# Patient Record
Sex: MALE | Race: WHITE | URBAN - METROPOLITAN AREA
[De-identification: names, ages, dates, MRNs, and addresses within clinical notes are randomized per-mention and may not be internally consistent; named-entity substitution may affect disease eponyms.]

---

## 2020-12-11 ENCOUNTER — OFFICE VISIT (OUTPATIENT)
Dept: URBAN - METROPOLITAN AREA CLINIC 13 | Facility: CLINIC | Age: 60
End: 2020-12-11
Payer: COMMERCIAL

## 2020-12-11 DIAGNOSIS — H43.811 VITREOUS DEGENERATION, RIGHT EYE: ICD-10-CM

## 2020-12-11 PROCEDURE — 92134 CPTRZ OPH DX IMG PST SGM RTA: CPT | Performed by: OPHTHALMOLOGY

## 2020-12-11 PROCEDURE — 92014 COMPRE OPH EXAM EST PT 1/>: CPT | Performed by: OPHTHALMOLOGY

## 2020-12-11 PROCEDURE — 67028 INJECTION EYE DRUG: CPT | Performed by: OPHTHALMOLOGY

## 2020-12-11 ASSESSMENT — INTRAOCULAR PRESSURE
OD: 13
OS: 15

## 2020-12-11 NOTE — IMPRESSION/PLAN
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
s/p Ozurdex OU 6/19/2020 Plan: Lost to follow-up for 6 months. Exam/OCT OS show moderate NPDR with persistent CSDME, s/p Ozurdex OU 6/19/2020. No view OD secondary to cataract. H/O poor response with Avastin, good response to Ozurdex. Rec Ozurdex OS today. RBA discussed. Pt elects to proceed with Ozurdex OS. Given h/o poor compliance with visits, pt may be a good candidate for Iluvien in the future. Cont BS/BP/chol control. 

6-8 weeks OCT OU re-eval

## 2021-06-21 ENCOUNTER — OFFICE VISIT (OUTPATIENT)
Dept: URBAN - METROPOLITAN AREA CLINIC 51 | Facility: CLINIC | Age: 61
End: 2021-06-21
Payer: COMMERCIAL

## 2021-06-21 DIAGNOSIS — H25.21 AGE-RELATED CATARACT, MORGANIAN TYPE, RIGHT EYE: ICD-10-CM

## 2021-06-21 DIAGNOSIS — H52.4 PRESBYOPIA: ICD-10-CM

## 2021-06-21 DIAGNOSIS — H25.812 COMBINED FORMS OF AGE-RELATED CATARACT, LEFT EYE: ICD-10-CM

## 2021-06-21 PROCEDURE — 99204 OFFICE O/P NEW MOD 45 MIN: CPT | Performed by: OPTOMETRIST

## 2021-06-21 PROCEDURE — 92134 CPTRZ OPH DX IMG PST SGM RTA: CPT | Performed by: OPTOMETRIST

## 2021-06-21 RX ORDER — METFORMIN HYDROCHLORIDE 500 MG/1
500 MG TABLET, FILM COATED ORAL
Qty: 0 | Refills: 0 | Status: ACTIVE
Start: 2021-06-21

## 2021-06-21 ASSESSMENT — KERATOMETRY: OS: 40.55

## 2021-06-21 ASSESSMENT — INTRAOCULAR PRESSURE
OD: 11
OS: 12

## 2021-06-21 ASSESSMENT — VISUAL ACUITY: OS: 20/100

## 2021-06-21 NOTE — IMPRESSION/PLAN
Impression: Age-related cataract, morganian type, right eye: H25.21.  Plan: Retinal clearance needed prior to cataract surgery

## 2021-06-21 NOTE — IMPRESSION/PLAN
Impression: Type 2 diabetes mellitus w/ moderate nonproliferative diabetic retinopathy w/ macular edema of bilateral eyes: P00.8347.
s/p Ozurdex OU 6/19/2020 Plan: Pt last saw Dr. Florentino Valverde at [facility] 6/19/20:
Lost to follow-up for 6 months. Exam/OCT OS show moderate NPDR with persistent CSDME, s/p Ozurdex OU 6/19/2020. No view OD secondary to cataract. H/O poor response with Avastin, good response to Ozurdex. Rec Ozurdex OS today. RBA discussed. Pt elects to proceed with Ozurdex OS. Given h/o poor compliance with visits, pt may be a good candidate for Iluvien in the future. Cont BS/BP/chol control Unable to view OD due to dense mature cataracts NEEDS RETINAL CLEARANCE PRIOR TO CATARACT SX

## 2021-06-21 NOTE — IMPRESSION/PLAN
Impression: Combined forms of age-related cataract, left eye: H25.812. Plan: Cataracts appear to be dense enough to account for patient's vision. However, with decreased view into the fundus cannot determine if there is any retinal pathology which may prohibit improved vision post operatively. Patient wishes to proceed with surgery, recommend phacoemulsification with intraocular lens OD then OS.  WILL NEED RETINAL CLEARANCE

## 2021-06-25 ENCOUNTER — OFFICE VISIT (OUTPATIENT)
Dept: URBAN - METROPOLITAN AREA CLINIC 13 | Facility: CLINIC | Age: 61
End: 2021-06-25
Payer: COMMERCIAL

## 2021-06-25 DIAGNOSIS — H25.13 AGE-RELATED NUCLEAR CATARACT, BILATERAL: ICD-10-CM

## 2021-06-25 DIAGNOSIS — E11.3313 TYPE 2 DIABETES MELLITUS W/ MODERATE NONPROLIFERATIVE DIABETIC RETINOPATHY W/ MACULAR EDEMA OF BILATERAL EYES: Primary | ICD-10-CM

## 2021-06-25 PROCEDURE — 92014 COMPRE OPH EXAM EST PT 1/>: CPT | Performed by: OPHTHALMOLOGY

## 2021-06-25 PROCEDURE — 92134 CPTRZ OPH DX IMG PST SGM RTA: CPT | Performed by: OPHTHALMOLOGY

## 2021-06-25 PROCEDURE — 76512 OPH US DX B-SCAN: CPT | Performed by: OPHTHALMOLOGY

## 2021-06-25 ASSESSMENT — INTRAOCULAR PRESSURE
OS: 13
OD: 13

## 2021-06-25 NOTE — IMPRESSION/PLAN
Impression: Type 2 diabetes mellitus w/ moderate nonproliferative diabetic retinopathy w/ macular edema of bilateral eyes: R60.9995.
s/p Ozurdex OD 6/19/2020, OS 12/11/2020 Plan: Lost to follow-up for 6 months. Exam/OCT OS cont to show moderate NPDR with improving CSDME s/p Ozurdex OS 12/11/2020. No view OD secondary to cataract. H/O poor response with Avastin, good response to Ozurdex. Rec observation today. Cont BS/BP/chol control. 

6 months for OCT OU re-eval

## 2021-06-25 NOTE — IMPRESSION/PLAN
Impression: Cataract OU Plan: Mature OD. B-scan shows retina fully attached, vitreous clear. Return to [facility] for CE/IOL OD.   Cleared to proceed

## 2021-11-09 ENCOUNTER — OFFICE VISIT (OUTPATIENT)
Dept: URBAN - METROPOLITAN AREA CLINIC 51 | Facility: CLINIC | Age: 61
End: 2021-11-09
Payer: COMMERCIAL

## 2021-11-09 DIAGNOSIS — Z79.84 LONG TERM (CURRENT) USE OF ORAL ANTIDIABETIC DRUGS: ICD-10-CM

## 2021-11-09 DIAGNOSIS — H43.312 VITREOUS MEMBRANES AND STRANDS, LEFT EYE: ICD-10-CM

## 2021-11-09 DIAGNOSIS — H25.813 COMBINED FORMS OF AGE-RELATED CATARACT, BILATERAL: Primary | ICD-10-CM

## 2021-11-09 PROCEDURE — 92134 CPTRZ OPH DX IMG PST SGM RTA: CPT | Performed by: OPTOMETRIST

## 2021-11-09 PROCEDURE — 99214 OFFICE O/P EST MOD 30 MIN: CPT | Performed by: OPTOMETRIST

## 2021-11-09 ASSESSMENT — INTRAOCULAR PRESSURE
OD: 14
OS: 17

## 2021-11-09 ASSESSMENT — VISUAL ACUITY
OD: LP
OS: 20/200

## 2021-11-09 ASSESSMENT — KERATOMETRY
OS: 45.06
OD: 44.49

## 2021-11-09 NOTE — IMPRESSION/PLAN
Impression: Combined forms of age-related cataract, bilateral: H25.813. Plan: Discussed cataracts with patient. Discussed treatment options. Surgical treatment is recommended. Surgical risks and benefits discussed. Patient elects surgical treatment. Recommend surgery OU, OD first. 
Patient is candidate/interested in standard and ORA. Aim OD: plano  Aim OS: plano Patient understands that he will need glasses for reading/all near work following surgery, and may need full time glasses for best possible vision after cataract surgery. **Patient needs retina clearance prior to cataract surgery. **

## 2021-11-09 NOTE — IMPRESSION/PLAN
Impression: Type 2 diabetes mellitus w/ moderate nonproliferative diabetic retinopathy w/ macular edema of bilateral eyes: G34.8507.
s/p Ozurdex OU 6/19/2020 Plan: Lost to follow up for 5 months - recent leg amputation. Exam/OCT OS show moderate NPDR s/p Ozurdex OU 6/19/2020. No view OD secondary to cataract. Patient needs retina clearance prior to proceeding with cataract surgery. Refer to retina next available for further evaluation.

## 2021-11-09 NOTE — IMPRESSION/PLAN
Impression: Vitreous membranes and strands, left eye: H43.312. *No view OD Plan: Retina is attached 360 degrees with no signs of any retinal holes, tears, or detachments observed on today's dilated examination. Pt to RTC ASAP if increase in floaters, flashes, or curtain or veil taking away vision.

## 2021-12-10 ENCOUNTER — OFFICE VISIT (OUTPATIENT)
Dept: URBAN - METROPOLITAN AREA CLINIC 13 | Facility: CLINIC | Age: 61
End: 2021-12-10
Payer: COMMERCIAL

## 2021-12-10 DIAGNOSIS — S05.11XA CONTUSION OF EYEBALL AND ORBITAL TISSUES, RIGHT EYE, INITIAL ENCOUNTER: ICD-10-CM

## 2021-12-10 PROCEDURE — 76512 OPH US DX B-SCAN: CPT | Performed by: OPHTHALMOLOGY

## 2021-12-10 PROCEDURE — 92014 COMPRE OPH EXAM EST PT 1/>: CPT | Performed by: OPHTHALMOLOGY

## 2021-12-10 PROCEDURE — 92134 CPTRZ OPH DX IMG PST SGM RTA: CPT | Performed by: OPHTHALMOLOGY

## 2021-12-10 ASSESSMENT — INTRAOCULAR PRESSURE
OD: 10
OS: 12

## 2021-12-10 NOTE — IMPRESSION/PLAN
Impression: Type 2 diabetes mellitus w/ moderate nonproliferative diabetic retinopathy w/ macular edema of bilateral eyes: U01.9444.
s/p Ozurdex OU 6/19/2020 Plan: Lost to follow-up for 6 months. Exam/OCT OS show moderate NPDR with resolved CSDME. No view OD secondary to cataract. B-scan OD confirms the retina remains attached, no VH or RD. Rec observation today. Cont BS/BP/chol control RTC 6 months for exam/Optos FA/OCT OU

## 2022-03-21 ENCOUNTER — OFFICE VISIT (OUTPATIENT)
Dept: URBAN - METROPOLITAN AREA CLINIC 10 | Facility: CLINIC | Age: 62
End: 2022-03-21
Payer: COMMERCIAL

## 2022-03-21 DIAGNOSIS — H25.12 AGE-RELATED NUCLEAR CATARACT, LEFT EYE: ICD-10-CM

## 2022-03-21 PROCEDURE — 92134 CPTRZ OPH DX IMG PST SGM RTA: CPT | Performed by: OPHTHALMOLOGY

## 2022-03-21 PROCEDURE — 99204 OFFICE O/P NEW MOD 45 MIN: CPT | Performed by: OPHTHALMOLOGY

## 2022-03-21 PROCEDURE — 76512 OPH US DX B-SCAN: CPT | Performed by: OPHTHALMOLOGY

## 2022-03-21 ASSESSMENT — INTRAOCULAR PRESSURE
OS: 12
OD: 13

## 2022-03-21 NOTE — IMPRESSION/PLAN
Impression: Age-related cataract, morganian type, right eye: H25.21.  Plan: B scan normal, okay to proceed with right CAT IOL

## 2022-03-21 NOTE — IMPRESSION/PLAN
Impression: Type 2 diabetes mellitus w/ moderate nonproliferative diabetic retinopathy w/ macular edema of bilateral eyes: C14.4412.
s/p Ozurdex OU 6/19/2020 Plan: exam shows few hemorrhages, will decide on DR after the CAT IOL

## 2022-05-27 ENCOUNTER — OFFICE VISIT (OUTPATIENT)
Dept: URBAN - METROPOLITAN AREA CLINIC 10 | Facility: CLINIC | Age: 62
End: 2022-05-27
Payer: COMMERCIAL

## 2022-05-27 DIAGNOSIS — E11.3313 TYPE 2 DIABETES MELLITUS W/ MODERATE NONPROLIFERATIVE DIABETIC RETINOPATHY W/ MACULAR EDEMA OF BILATERAL EYES: ICD-10-CM

## 2022-05-27 DIAGNOSIS — H25.813 COMBINED FORMS OF AGE-RELATED CATARACT, BILATERAL: Primary | ICD-10-CM

## 2022-05-27 PROCEDURE — 99214 OFFICE O/P EST MOD 30 MIN: CPT | Performed by: STUDENT IN AN ORGANIZED HEALTH CARE EDUCATION/TRAINING PROGRAM

## 2022-05-27 ASSESSMENT — VISUAL ACUITY: OS: 20/400

## 2022-05-27 ASSESSMENT — INTRAOCULAR PRESSURE
OD: 10
OS: 19

## 2022-05-27 NOTE — IMPRESSION/PLAN
Impression: Type 2 diabetes mellitus w/ moderate nonproliferative diabetic retinopathy w/ macular edema of bilateral eyes: T67.6284.
s/p Ozurdex OU 6/19/2020 Plan: Monitor following cataract sx

## 2022-05-27 NOTE — IMPRESSION/PLAN
Impression: Combined forms of age-related cataract, bilateral: H25.813. Plan:  Discussed cataracts with patient. Discussed treatment options. Surgical treatment is recommended. Surgical risks and benefits discussed. Patient elects surgical treatment. Recommend surgery OU, OD first. standard lens, Aim OD: undecided. Aim OS: undecided. 
-Cleared by retina 03/21/22

## 2022-08-02 ENCOUNTER — TESTING ONLY (OUTPATIENT)
Dept: URBAN - METROPOLITAN AREA CLINIC 10 | Facility: CLINIC | Age: 62
End: 2022-08-02
Payer: COMMERCIAL

## 2022-08-02 DIAGNOSIS — H43.312 VITREOUS MEMBRANES AND STRANDS, LEFT EYE: ICD-10-CM

## 2022-08-02 DIAGNOSIS — E11.3313 TYPE 2 DIABETES MELLITUS W/ MODERATE NONPROLIFERATIVE DIABETIC RETINOPATHY W/ MACULAR EDEMA OF BILATERAL EYES: ICD-10-CM

## 2022-08-02 DIAGNOSIS — S05.11XA CONTUSION OF EYEBALL AND ORBITAL TISSUES, RIGHT EYE, INITIAL ENCOUNTER: Primary | ICD-10-CM

## 2022-08-02 PROCEDURE — 99214 OFFICE O/P EST MOD 30 MIN: CPT | Performed by: OPHTHALMOLOGY

## 2022-08-02 RX ORDER — PREDNISOLONE ACETATE 10 MG/ML
1 % SUSPENSION/ DROPS OPHTHALMIC
Qty: 5 | Refills: 2 | Status: ACTIVE
Start: 2022-08-02

## 2022-08-02 RX ORDER — DICLOFENAC SODIUM 1 MG/ML
0.1 % SOLUTION/ DROPS OPHTHALMIC
Qty: 5 | Refills: 2 | Status: ACTIVE
Start: 2022-08-02

## 2022-08-02 ASSESSMENT — INTRAOCULAR PRESSURE
OD: 9
OS: 21
OD: 9
OS: 21

## 2022-08-02 NOTE — IMPRESSION/PLAN
Impression: Combined forms of age-related cataract, bilateral: H25.813. Plan: PLAN:  (( AIM -0.25 OU, INJECTABLE OU +drops ou (npdr) (DEXYCU 1st choice), EPI, poss stretch, Trypan Blue ! , NO UPGRADES, NO LRI OU: declined, VERY DENSE , Glaucoma coverage, Retina Clearance done, Have Vitrector and Sulcus IOL available, HAVE DUET SYSTEM AVAILABLE, NOTE LENS TYPE :  [id number] . s/p injections ou, poss hx of trauma OD , pt is claustrophobic  -- TOLD MAY NEED TO LEAVE APHAKIC OD  )) Discussed cataract diagnosis with the patient. Appropriate testing ordered for cataract diagnosis prior to Preop. Risks and benefits of surgical treatment were discussed and understood. Premium options discussed. Pt understands the need for glasses after surgery. Patient desires surgical treatment to OU, OD First. Both eyes examined, medically necessary due to impact in activities of daily living. Discussed MUCH HIGHER risks of complications due to MATURE lens and delayed healing after surgery. Discussed higher risks with smaller pupil and possible use of iris stretch. Discussed there is a chance of developing capsular haze after surgery, which may be corrected with laser/yag after surgery. discussed higher risk for complications due to prior injections and prior trauma.

## 2022-08-02 NOTE — IMPRESSION/PLAN
Impression: Type 2 diabetes mellitus w/ moderate non-proliferative diabetic retinopathy w/ macular edema of bilateral eyes: Z85.7779.
s/p Ozurdex OU 6/19/2020 Plan: s/p injections OU. Discussed diet, exercise, nutrition. Good blood sugar and blood pressure control. Maintain follow up with PCP. Discussed with patient, understands this may limit vision after surgery.

## 2022-08-02 NOTE — IMPRESSION/PLAN
Impression: Vitreous membranes and strands, left eye: H43.312. *No view OD Plan: Discussed signs and symptoms of retinal detachment (flashes,floaters, curtain) as precaution. Patient instructed to call or return to clinic if condition gets worse. Discussed with patient, understands this may limit vision after surgery.

## 2022-08-05 ENCOUNTER — ADULT PHYSICAL (OUTPATIENT)
Dept: URBAN - METROPOLITAN AREA CLINIC 10 | Facility: CLINIC | Age: 62
End: 2022-08-05
Payer: COMMERCIAL

## 2022-08-05 DIAGNOSIS — H25.813 COMBINED FORMS OF AGE-RELATED CATARACT, BILATERAL: ICD-10-CM

## 2022-08-05 DIAGNOSIS — Z01.818 ENCOUNTER FOR OTHER PREPROCEDURAL EXAMINATION: Primary | ICD-10-CM

## 2022-08-05 PROCEDURE — 99203 OFFICE O/P NEW LOW 30 MIN: CPT | Performed by: PHYSICIAN ASSISTANT

## 2022-08-09 ENCOUNTER — SURGERY (OUTPATIENT)
Dept: URBAN - METROPOLITAN AREA SURGERY 5 | Facility: SURGERY | Age: 62
End: 2022-08-09
Payer: COMMERCIAL

## 2022-08-09 DIAGNOSIS — H25.813 COMBINED FORMS OF AGE-RELATED CATARACT, BILATERAL: Primary | ICD-10-CM

## 2022-08-09 PROCEDURE — 66982 XCAPSL CTRC RMVL CPLX WO ECP: CPT | Performed by: OPHTHALMOLOGY

## 2022-08-10 ENCOUNTER — POST-OPERATIVE VISIT (OUTPATIENT)
Dept: URBAN - METROPOLITAN AREA CLINIC 10 | Facility: CLINIC | Age: 62
End: 2022-08-10
Payer: COMMERCIAL

## 2022-08-10 DIAGNOSIS — Z48.810 ENCOUNTER FOR SURGICAL AFTERCARE FOLLOWING SURGERY ON A SENSE ORGAN: Primary | ICD-10-CM

## 2022-08-10 PROCEDURE — 99024 POSTOP FOLLOW-UP VISIT: CPT | Performed by: STUDENT IN AN ORGANIZED HEALTH CARE EDUCATION/TRAINING PROGRAM

## 2022-08-10 ASSESSMENT — INTRAOCULAR PRESSURE: OD: 15

## 2022-08-10 NOTE — IMPRESSION/PLAN
Impression: S/P Cataract Extraction by phacoemulsification with IOL placement OD - 1 Day. Encounter for surgical aftercare following surgery on a sense organ  Z48.810. Post operative instructions reviewed - Plan: Reviewed post-op instructions. RTC if any pain or sudden changes to vision.

## 2022-08-17 ENCOUNTER — POST-OPERATIVE VISIT (OUTPATIENT)
Dept: URBAN - METROPOLITAN AREA CLINIC 10 | Facility: CLINIC | Age: 62
End: 2022-08-17
Payer: COMMERCIAL

## 2022-08-17 DIAGNOSIS — Z48.810 ENCOUNTER FOR SURGICAL AFTERCARE FOLLOWING SURGERY ON A SENSE ORGAN: Primary | ICD-10-CM

## 2022-08-17 PROCEDURE — 99024 POSTOP FOLLOW-UP VISIT: CPT | Performed by: STUDENT IN AN ORGANIZED HEALTH CARE EDUCATION/TRAINING PROGRAM

## 2022-08-17 ASSESSMENT — VISUAL ACUITY: OD: 20/40

## 2022-08-17 ASSESSMENT — INTRAOCULAR PRESSURE: OD: 13

## 2022-08-23 ENCOUNTER — SURGERY (OUTPATIENT)
Dept: URBAN - METROPOLITAN AREA SURGERY 5 | Facility: SURGERY | Age: 62
End: 2022-08-23
Payer: COMMERCIAL

## 2022-08-23 DIAGNOSIS — H25.812 COMBINED FORMS OF AGE-RELATED CATARACT, LEFT EYE: ICD-10-CM

## 2022-08-23 DIAGNOSIS — H25.12 AGE-RELATED NUCLEAR CATARACT, LEFT EYE: Primary | ICD-10-CM

## 2022-08-23 PROCEDURE — 66982 XCAPSL CTRC RMVL CPLX WO ECP: CPT | Performed by: OPHTHALMOLOGY
